# Patient Record
Sex: FEMALE | Race: WHITE | NOT HISPANIC OR LATINO
[De-identification: names, ages, dates, MRNs, and addresses within clinical notes are randomized per-mention and may not be internally consistent; named-entity substitution may affect disease eponyms.]

---

## 2017-03-27 ENCOUNTER — APPOINTMENT (OUTPATIENT)
Dept: DERMATOLOGY | Facility: CLINIC | Age: 57
End: 2017-03-27

## 2017-03-27 VITALS
BODY MASS INDEX: 18.78 KG/M2 | DIASTOLIC BLOOD PRESSURE: 62 MMHG | WEIGHT: 110 LBS | HEIGHT: 64 IN | SYSTOLIC BLOOD PRESSURE: 118 MMHG

## 2017-03-27 DIAGNOSIS — Z84.0 FAMILY HISTORY OF DISEASES OF THE SKIN AND SUBCUTANEOUS TISSUE: ICD-10-CM

## 2017-03-27 DIAGNOSIS — L30.9 DERMATITIS, UNSPECIFIED: ICD-10-CM

## 2017-04-13 ENCOUNTER — OUTPATIENT (OUTPATIENT)
Dept: OUTPATIENT SERVICES | Facility: HOSPITAL | Age: 57
LOS: 1 days | Discharge: HOME | End: 2017-04-13

## 2017-04-13 DIAGNOSIS — Z98.89 OTHER SPECIFIED POSTPROCEDURAL STATES: Chronic | ICD-10-CM

## 2017-04-13 DIAGNOSIS — C50.919 MALIGNANT NEOPLASM OF UNSPECIFIED SITE OF UNSPECIFIED FEMALE BREAST: ICD-10-CM

## 2017-04-14 ENCOUNTER — APPOINTMENT (OUTPATIENT)
Dept: HEMATOLOGY ONCOLOGY | Facility: CLINIC | Age: 57
End: 2017-04-14

## 2017-04-14 VITALS
HEART RATE: 84 BPM | BODY MASS INDEX: 19.83 KG/M2 | TEMPERATURE: 98.9 F | WEIGHT: 115.52 LBS | SYSTOLIC BLOOD PRESSURE: 110 MMHG | DIASTOLIC BLOOD PRESSURE: 68 MMHG | RESPIRATION RATE: 16 BRPM

## 2017-04-14 RX ORDER — CLOBETASOL PROPIONATE 0.5 MG/G
0.05 OINTMENT TOPICAL
Qty: 1 | Refills: 3 | Status: DISCONTINUED | COMMUNITY
Start: 2017-03-27 | End: 2017-04-14

## 2017-05-01 ENCOUNTER — APPOINTMENT (OUTPATIENT)
Dept: DERMATOLOGY | Facility: CLINIC | Age: 57
End: 2017-05-01

## 2017-08-01 ENCOUNTER — APPOINTMENT (OUTPATIENT)
Dept: SURGICAL ONCOLOGY | Facility: CLINIC | Age: 57
End: 2017-08-01
Payer: COMMERCIAL

## 2017-08-01 VITALS
HEIGHT: 64 IN | DIASTOLIC BLOOD PRESSURE: 69 MMHG | HEART RATE: 82 BPM | WEIGHT: 110 LBS | OXYGEN SATURATION: 100 % | BODY MASS INDEX: 18.78 KG/M2 | SYSTOLIC BLOOD PRESSURE: 113 MMHG

## 2017-08-01 PROCEDURE — 99215 OFFICE O/P EST HI 40 MIN: CPT

## 2017-10-23 ENCOUNTER — OUTPATIENT (OUTPATIENT)
Dept: OUTPATIENT SERVICES | Facility: HOSPITAL | Age: 57
LOS: 1 days | Discharge: HOME | End: 2017-10-23

## 2017-10-23 DIAGNOSIS — Z98.89 OTHER SPECIFIED POSTPROCEDURAL STATES: Chronic | ICD-10-CM

## 2017-10-23 DIAGNOSIS — C50.919 MALIGNANT NEOPLASM OF UNSPECIFIED SITE OF UNSPECIFIED FEMALE BREAST: ICD-10-CM

## 2017-10-27 ENCOUNTER — APPOINTMENT (OUTPATIENT)
Dept: HEMATOLOGY ONCOLOGY | Facility: CLINIC | Age: 57
End: 2017-10-27
Payer: COMMERCIAL

## 2017-10-27 VITALS
HEART RATE: 85 BPM | TEMPERATURE: 98.9 F | RESPIRATION RATE: 16 BRPM | DIASTOLIC BLOOD PRESSURE: 69 MMHG | SYSTOLIC BLOOD PRESSURE: 107 MMHG | WEIGHT: 114.64 LBS | BODY MASS INDEX: 19.68 KG/M2 | OXYGEN SATURATION: 98 %

## 2017-10-27 PROCEDURE — 99214 OFFICE O/P EST MOD 30 MIN: CPT

## 2017-10-27 RX ORDER — ALPRAZOLAM 0.25 MG/1
0.25 TABLET ORAL
Qty: 15 | Refills: 0 | Status: ACTIVE | COMMUNITY
Start: 2017-08-14

## 2018-04-27 ENCOUNTER — OUTPATIENT (OUTPATIENT)
Dept: OUTPATIENT SERVICES | Facility: HOSPITAL | Age: 58
LOS: 1 days | Discharge: HOME | End: 2018-04-27

## 2018-04-27 ENCOUNTER — APPOINTMENT (OUTPATIENT)
Dept: HEMATOLOGY ONCOLOGY | Facility: CLINIC | Age: 58
End: 2018-04-27
Payer: COMMERCIAL

## 2018-04-27 VITALS
HEART RATE: 93 BPM | RESPIRATION RATE: 16 BRPM | TEMPERATURE: 99.4 F | SYSTOLIC BLOOD PRESSURE: 101 MMHG | DIASTOLIC BLOOD PRESSURE: 63 MMHG | OXYGEN SATURATION: 99 % | WEIGHT: 110.34 LBS | BODY MASS INDEX: 18.94 KG/M2

## 2018-04-27 DIAGNOSIS — Z98.89 OTHER SPECIFIED POSTPROCEDURAL STATES: Chronic | ICD-10-CM

## 2018-04-27 DIAGNOSIS — C50.919 MALIGNANT NEOPLASM OF UNSPECIFIED SITE OF UNSPECIFIED FEMALE BREAST: ICD-10-CM

## 2018-04-27 PROCEDURE — 99214 OFFICE O/P EST MOD 30 MIN: CPT

## 2018-06-14 ENCOUNTER — APPOINTMENT (OUTPATIENT)
Dept: PLASTIC SURGERY | Facility: CLINIC | Age: 58
End: 2018-06-14
Payer: SELF-PAY

## 2018-06-14 PROCEDURE — 99024 POSTOP FOLLOW-UP VISIT: CPT

## 2018-07-09 ENCOUNTER — APPOINTMENT (OUTPATIENT)
Dept: PLASTIC SURGERY | Facility: CLINIC | Age: 58
End: 2018-07-09
Payer: COMMERCIAL

## 2018-07-09 PROCEDURE — 99199 UNLISTED SPECIAL SVC PX/RPRT: CPT | Mod: NC

## 2018-08-13 ENCOUNTER — APPOINTMENT (OUTPATIENT)
Dept: PLASTIC SURGERY | Facility: CLINIC | Age: 58
End: 2018-08-13
Payer: COMMERCIAL

## 2018-08-13 PROCEDURE — 99024 POSTOP FOLLOW-UP VISIT: CPT

## 2018-10-01 ENCOUNTER — APPOINTMENT (OUTPATIENT)
Dept: SURGICAL ONCOLOGY | Facility: CLINIC | Age: 58
End: 2018-10-01
Payer: COMMERCIAL

## 2018-10-01 VITALS
SYSTOLIC BLOOD PRESSURE: 95 MMHG | RESPIRATION RATE: 16 BRPM | HEIGHT: 64 IN | WEIGHT: 114 LBS | OXYGEN SATURATION: 98 % | HEART RATE: 85 BPM | BODY MASS INDEX: 19.46 KG/M2 | DIASTOLIC BLOOD PRESSURE: 64 MMHG

## 2018-10-01 PROCEDURE — 99214 OFFICE O/P EST MOD 30 MIN: CPT

## 2018-10-17 ENCOUNTER — OUTPATIENT (OUTPATIENT)
Dept: OUTPATIENT SERVICES | Facility: HOSPITAL | Age: 58
LOS: 1 days | Discharge: HOME | End: 2018-10-17

## 2018-10-17 DIAGNOSIS — Z98.89 OTHER SPECIFIED POSTPROCEDURAL STATES: Chronic | ICD-10-CM

## 2018-10-17 DIAGNOSIS — C50.919 MALIGNANT NEOPLASM OF UNSPECIFIED SITE OF UNSPECIFIED FEMALE BREAST: ICD-10-CM

## 2018-10-23 ENCOUNTER — APPOINTMENT (OUTPATIENT)
Dept: HEMATOLOGY ONCOLOGY | Facility: CLINIC | Age: 58
End: 2018-10-23
Payer: COMMERCIAL

## 2018-10-23 VITALS
OXYGEN SATURATION: 100 % | SYSTOLIC BLOOD PRESSURE: 98 MMHG | HEART RATE: 80 BPM | TEMPERATURE: 98.9 F | DIASTOLIC BLOOD PRESSURE: 63 MMHG | BODY MASS INDEX: 19.56 KG/M2 | WEIGHT: 113.98 LBS | RESPIRATION RATE: 16 BRPM

## 2018-10-23 PROCEDURE — 99214 OFFICE O/P EST MOD 30 MIN: CPT

## 2018-12-04 ENCOUNTER — RX RENEWAL (OUTPATIENT)
Age: 58
End: 2018-12-04

## 2019-04-22 ENCOUNTER — APPOINTMENT (OUTPATIENT)
Dept: PLASTIC SURGERY | Facility: CLINIC | Age: 59
End: 2019-04-22
Payer: COMMERCIAL

## 2019-04-22 NOTE — REASON FOR VISIT
[Follow-Up: _____] : a [unfilled] follow-up visit [FreeTextEntry1] : Patient presents to the office today for botox injections.

## 2019-04-23 ENCOUNTER — OUTPATIENT (OUTPATIENT)
Dept: OUTPATIENT SERVICES | Facility: HOSPITAL | Age: 59
LOS: 1 days | Discharge: HOME | End: 2019-04-23

## 2019-04-23 DIAGNOSIS — Z98.89 OTHER SPECIFIED POSTPROCEDURAL STATES: Chronic | ICD-10-CM

## 2019-04-23 DIAGNOSIS — C50.919 MALIGNANT NEOPLASM OF UNSPECIFIED SITE OF UNSPECIFIED FEMALE BREAST: ICD-10-CM

## 2019-04-26 ENCOUNTER — APPOINTMENT (OUTPATIENT)
Dept: HEMATOLOGY ONCOLOGY | Facility: CLINIC | Age: 59
End: 2019-04-26
Payer: COMMERCIAL

## 2019-04-26 VITALS
WEIGHT: 115.74 LBS | HEART RATE: 95 BPM | TEMPERATURE: 99.4 F | SYSTOLIC BLOOD PRESSURE: 110 MMHG | DIASTOLIC BLOOD PRESSURE: 67 MMHG | BODY MASS INDEX: 19.87 KG/M2 | RESPIRATION RATE: 16 BRPM | OXYGEN SATURATION: 100 %

## 2019-04-26 PROCEDURE — 99214 OFFICE O/P EST MOD 30 MIN: CPT

## 2019-04-30 NOTE — PHYSICAL EXAM
[Fully active, able to carry on all pre-disease performance without restriction] : Status 0 - Fully active, able to carry on all pre-disease performance without restriction [Normal] : affect appropriate [de-identified] : s/p B/L MRM with reconstruction, no masses; B/L ax neg

## 2019-04-30 NOTE — HISTORY OF PRESENT ILLNESS
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 Oncotype DX RS 7 = 6% risk of mets at 10 years with shelby alone. (low risk). Began tamoxifen 9/14.  \par \par In 2018 had [facility] Analyses of 34 Genes Associated with Hereditary Cancer\par Found to have : VUS of CHEK2 and POLE \par \par  [de-identified] : Here for a follow up visit, and doing well.. Tolerating tamoxifen well, without any related side effects. Occasional cramping of the feet, no change and easy to tolerate. She notes a good appetite, stable wt and excellent performance status.  Not dancing as much as working but stays active.  Is in the process of moving to a new home soon. \par \par Continues to have reg menses with occasional irreg / missed periods. \par \par Had [facility] Analyses of 34 Genes Associated with Hereditary Cancer\par Found to have : VUS of CHEK2 and POLE \par \par colonoscopy: 2015 (?benign polyp removed, 5 year f/up recommended)\par Pap: 8/2018: neg\par

## 2019-09-20 ENCOUNTER — TRANSCRIPTION ENCOUNTER (OUTPATIENT)
Age: 59
End: 2019-09-20

## 2019-10-07 ENCOUNTER — APPOINTMENT (OUTPATIENT)
Dept: PLASTIC SURGERY | Facility: CLINIC | Age: 59
End: 2019-10-07
Payer: COMMERCIAL

## 2019-10-07 DIAGNOSIS — Z90.13 ACQUIRED ABSENCE OF BILATERAL BREASTS AND NIPPLES: ICD-10-CM

## 2019-10-07 PROCEDURE — 99214 OFFICE O/P EST MOD 30 MIN: CPT

## 2019-11-05 ENCOUNTER — APPOINTMENT (OUTPATIENT)
Dept: SURGICAL ONCOLOGY | Facility: CLINIC | Age: 59
End: 2019-11-05
Payer: COMMERCIAL

## 2019-11-05 VITALS
DIASTOLIC BLOOD PRESSURE: 63 MMHG | HEIGHT: 64 IN | BODY MASS INDEX: 19.12 KG/M2 | WEIGHT: 112 LBS | SYSTOLIC BLOOD PRESSURE: 113 MMHG | OXYGEN SATURATION: 100 % | HEART RATE: 90 BPM | RESPIRATION RATE: 16 BRPM

## 2019-11-05 PROCEDURE — 99214 OFFICE O/P EST MOD 30 MIN: CPT

## 2019-11-05 NOTE — PHYSICAL EXAM
[Normal] : supple, no neck mass and thyroid not enlarged [Normal Supraclavicular Lymph Nodes] : normal supraclavicular lymph nodes [Normal Axillary Lymph Nodes] : normal axillary lymph nodes [Normal] : oriented to person, place and time, with appropriate affect [FreeTextEntry1] : RC present for exam.  [de-identified] : Normal S1, S2. Regular rate and rhythm. [de-identified] : Complete breast exam performed in supine and upright positions. S/p b/l mastectomy with reconstruction.  No enlarged axillary or supraclavicular lymph nodes.  [de-identified] : Clear breath sounds bilaterally, normal respiratory effort.

## 2019-11-05 NOTE — ASSESSMENT
[FreeTextEntry1] : Imp:\par No evidence of new or recurrent lesions - hx of right breast infiltrating carcinoma s/p b/l mastectomy with reconstruction\par No suspicious lesions on exam.\par \par Plan:\par Continue yearly surveillance\par Replacement of textured implants with Dr. Gomez Jan 2020\par \par

## 2019-11-07 ENCOUNTER — OUTPATIENT (OUTPATIENT)
Dept: OUTPATIENT SERVICES | Facility: HOSPITAL | Age: 59
LOS: 1 days | Discharge: HOME | End: 2019-11-07

## 2019-11-07 DIAGNOSIS — C50.919 MALIGNANT NEOPLASM OF UNSPECIFIED SITE OF UNSPECIFIED FEMALE BREAST: ICD-10-CM

## 2019-11-07 DIAGNOSIS — Z98.89 OTHER SPECIFIED POSTPROCEDURAL STATES: Chronic | ICD-10-CM

## 2019-11-08 ENCOUNTER — APPOINTMENT (OUTPATIENT)
Dept: HEMATOLOGY ONCOLOGY | Facility: CLINIC | Age: 59
End: 2019-11-08
Payer: COMMERCIAL

## 2019-11-08 VITALS
BODY MASS INDEX: 19.83 KG/M2 | RESPIRATION RATE: 16 BRPM | HEART RATE: 87 BPM | TEMPERATURE: 98.2 F | DIASTOLIC BLOOD PRESSURE: 71 MMHG | OXYGEN SATURATION: 100 % | SYSTOLIC BLOOD PRESSURE: 117 MMHG | WEIGHT: 115.52 LBS

## 2019-11-08 PROCEDURE — 99214 OFFICE O/P EST MOD 30 MIN: CPT

## 2019-11-08 NOTE — PHYSICAL EXAM
[Fully active, able to carry on all pre-disease performance without restriction] : Status 0 - Fully active, able to carry on all pre-disease performance without restriction [Normal] : affect appropriate [de-identified] : s/p B/L MRM with reconstruction, no masses; B/L ax neg

## 2019-11-08 NOTE — HISTORY OF PRESENT ILLNESS
[Disease: _____________________] : Disease: [unfilled] [T: ___] : T[unfilled] [N: ___] : N[unfilled] [AJCC Stage: ____] : AJCC Stage: [unfilled] [M: ___] : M[unfilled] [de-identified] : Here for a follow up visit, and doing well.. \par \par Tolerating tamoxifen well, without any related side effects. Occasional cramping of the feet, no change and easy to tolerate. She notes a good appetite, stable wt and excellent performance status.  Going back to dancing. \par \par Was informed she has textured implant sat risk for causing lymphoma. She was contacted by Dr. Patricia antony dis planning on having exchange i\par \par Now has irreg menses, last 2 weeks ago.. \par \par Had [facility] Analyses of 34 Genes Associated with Hereditary Cancer\par Found to have : VUS of CHEK2 and POLE \par \par colonoscopy: 2015 (?benign polyp removed, 5 year f/up recommended)\par Pap: 8/2018: neg\par  [de-identified] : H/O B/L MRM / reconstruction for right breast 0.7 cm predom mod diff IDC and focally with features of ILC, LCIS, 0/6 SLNs, ER+ AR+ Her 2 neg; Left breast neg with 0/3 left SLNs. Oncotype DX RS 7 = 6% risk of mets at 10 years with shelby alone. (low risk). Began tamoxifen 9/14.  \par \par In 2018 had [facility] Analyses of 34 Genes Associated with Hereditary Cancer\par Found to have : VUS of CHEK2 and POLE \par \par

## 2019-11-10 ENCOUNTER — TRANSCRIPTION ENCOUNTER (OUTPATIENT)
Age: 59
End: 2019-11-10

## 2020-01-08 ENCOUNTER — RX RENEWAL (OUTPATIENT)
Age: 60
End: 2020-01-08

## 2020-03-30 ENCOUNTER — APPOINTMENT (OUTPATIENT)
Dept: PLASTIC SURGERY | Facility: CLINIC | Age: 60
End: 2020-03-30

## 2020-05-20 ENCOUNTER — OUTPATIENT (OUTPATIENT)
Dept: OUTPATIENT SERVICES | Facility: HOSPITAL | Age: 60
LOS: 1 days | Discharge: HOME | End: 2020-05-20

## 2020-05-20 DIAGNOSIS — Z98.89 OTHER SPECIFIED POSTPROCEDURAL STATES: Chronic | ICD-10-CM

## 2020-05-20 DIAGNOSIS — C50.919 MALIGNANT NEOPLASM OF UNSPECIFIED SITE OF UNSPECIFIED FEMALE BREAST: ICD-10-CM

## 2020-05-22 ENCOUNTER — APPOINTMENT (OUTPATIENT)
Dept: HEMATOLOGY ONCOLOGY | Facility: CLINIC | Age: 60
End: 2020-05-22
Payer: COMMERCIAL

## 2020-05-22 PROCEDURE — 99213 OFFICE O/P EST LOW 20 MIN: CPT | Mod: 95

## 2020-05-22 RX ORDER — SULFAMETHOXAZOLE AND TRIMETHOPRIM 800; 160 MG/1; MG/1
800-160 TABLET ORAL
Qty: 14 | Refills: 0 | Status: DISCONTINUED | COMMUNITY
Start: 2020-01-08 | End: 2020-05-22

## 2020-05-22 RX ORDER — OXYCODONE AND ACETAMINOPHEN 5; 325 MG/1; MG/1
5-325 TABLET ORAL
Qty: 20 | Refills: 0 | Status: DISCONTINUED | COMMUNITY
Start: 2020-01-08 | End: 2020-05-22

## 2020-05-22 NOTE — HISTORY OF PRESENT ILLNESS
[Home] : at home, [unfilled] , at the time of the visit. [Medical Office: ([facility])___] : at the medical office located in  [Verbal consent obtained from patient] : the patient, [unfilled] [Disease: _____________________] : Disease: [unfilled] [T: ___] : T[unfilled] [N: ___] : N[unfilled] [M: ___] : M[unfilled] [AJCC Stage: ____] : AJCC Stage: [unfilled] [de-identified] : H/O B/L MRM / reconstruction for right breast 0.7 cm predom mod diff IDC and focally with features of ILC, LCIS, 0/6 SLNs, ER+ ND+ Her 2 neg; Left breast neg with 0/3 left SLNs. Oncotype DX RS 7 = 6% risk of mets at 10 years with shelby alone. (low risk). Began tamoxifen 9/14.  \par \par In 2018 had [facility] Analyses of 34 Genes Associated with Hereditary Cancer\par Found to have : VUS of CHEK2 and POLE \par \par  [de-identified] : Seen today for a f/u telehealth visit secondary to the Covid crisis. \par \par Cont to tolerate tamoxifen well, without any related side effects. She notes a good appetite, stable wt and excellent performance status.   \par \par Was informed she has textured implant sat risk for causing lymphoma. She was contacted by Dr. Gomez and was planning on having exchange in January but she delayed. \par \par Cont to have irreg menses, last 2 mo ago and the prior 2 mo earlier\par  \par \par Had [facility] Analyses of 34 Genes Associated with Hereditary Cancer\par Found to have : VUS of CHEK2 and POLE \par \par colonoscopy: 2015 (?benign polyp removed, 5 year f/up recommended)\par Pap: 8/2018: neg\par

## 2020-07-27 ENCOUNTER — OUTPATIENT (OUTPATIENT)
Dept: OUTPATIENT SERVICES | Facility: HOSPITAL | Age: 60
LOS: 1 days | Discharge: HOME | End: 2020-07-27

## 2020-07-27 DIAGNOSIS — Z98.89 OTHER SPECIFIED POSTPROCEDURAL STATES: Chronic | ICD-10-CM

## 2020-07-27 DIAGNOSIS — C50.919 MALIGNANT NEOPLASM OF UNSPECIFIED SITE OF UNSPECIFIED FEMALE BREAST: ICD-10-CM

## 2020-07-29 ENCOUNTER — APPOINTMENT (OUTPATIENT)
Dept: HEMATOLOGY ONCOLOGY | Facility: CLINIC | Age: 60
End: 2020-07-29

## 2020-09-13 ENCOUNTER — OUTPATIENT (OUTPATIENT)
Dept: OUTPATIENT SERVICES | Facility: HOSPITAL | Age: 60
LOS: 1 days | Discharge: HOME | End: 2020-09-13

## 2020-09-13 DIAGNOSIS — Z98.89 OTHER SPECIFIED POSTPROCEDURAL STATES: Chronic | ICD-10-CM

## 2020-09-13 DIAGNOSIS — C50.919 MALIGNANT NEOPLASM OF UNSPECIFIED SITE OF UNSPECIFIED FEMALE BREAST: ICD-10-CM

## 2020-09-15 ENCOUNTER — APPOINTMENT (OUTPATIENT)
Dept: HEMATOLOGY ONCOLOGY | Facility: CLINIC | Age: 60
End: 2020-09-15
Payer: COMMERCIAL

## 2020-09-15 VITALS
BODY MASS INDEX: 20.25 KG/M2 | OXYGEN SATURATION: 100 % | HEIGHT: 64.02 IN | WEIGHT: 118.61 LBS | RESPIRATION RATE: 16 BRPM | HEART RATE: 76 BPM | DIASTOLIC BLOOD PRESSURE: 71 MMHG | TEMPERATURE: 98.6 F | SYSTOLIC BLOOD PRESSURE: 104 MMHG

## 2020-09-15 PROCEDURE — 99213 OFFICE O/P EST LOW 20 MIN: CPT

## 2020-09-16 NOTE — PHYSICAL EXAM
[Fully active, able to carry on all pre-disease performance without restriction] : Status 0 - Fully active, able to carry on all pre-disease performance without restriction [Normal] : affect appropriate [de-identified] : s/p B/L MRMs with implants, no masses. B/L ax neg

## 2020-09-16 NOTE — HISTORY OF PRESENT ILLNESS
[Disease: _____________________] : Disease: [unfilled] [T: ___] : T[unfilled] [N: ___] : N[unfilled] [M: ___] : M[unfilled] [AJCC Stage: ____] : AJCC Stage: [unfilled] [de-identified] : H/O B/L MRM / reconstruction for right breast 0.7 cm predom mod diff IDC and focally with features of ILC, LCIS, 0/6 SLNs, ER+ [place]+ Her 2 neg; Left breast neg with 0/3 left SLNs. Oncotype DX RS 7 = 6% risk of mets at 10 years with shelby alone. (low risk). Began tamoxifen 9/14.  \par \par In 2018 had [facility] Analyses of 34 Genes Associated with Hereditary Cancer\par Found to have : VUS of CHEK2 and POLE \par \par  [de-identified] : Seen today for a f/u visit. \par \par Cont to tolerate tamoxifen well, without any related side effects. She notes a good appetite, stable wt and excellent performance status, although she is exercising less than usual. Getting ready to go back to school. .   \par \par Cont to have irreg menses over the past year and LMP was 7/20. \par  \par Had [facility] Analyses of 34 Genes Associated with Hereditary Cancer\par Found to have : VUS of CHEK2 and POLE \par \par

## 2020-09-21 ENCOUNTER — APPOINTMENT (OUTPATIENT)
Dept: PLASTIC SURGERY | Facility: CLINIC | Age: 60
End: 2020-09-21
Payer: SELF-PAY

## 2020-12-22 NOTE — HISTORY OF PRESENT ILLNESS
[de-identified] : Cammy is a 58-year-old woman status post bilateral mastectomy for infiltrating carcinoma (0.7cm) with reconstruction on 2/27/15 by Dr. Gomez. She is presently taking tamoxifen under the guidance of Dr. Mcgill (med/onc).  Because of the [employer] textured impant recall the patient plans on having them replaced with Dr. Gomez Norman 10, 2020.\par \par Today she denies chest wall discomfort, palpable chest wall nodules or skin changes. 10

## 2021-03-08 ENCOUNTER — APPOINTMENT (OUTPATIENT)
Dept: PLASTIC SURGERY | Facility: CLINIC | Age: 61
End: 2021-03-08
Payer: SELF-PAY

## 2021-04-08 ENCOUNTER — NON-APPOINTMENT (OUTPATIENT)
Age: 61
End: 2021-04-08

## 2021-04-23 ENCOUNTER — OUTPATIENT (OUTPATIENT)
Dept: OUTPATIENT SERVICES | Facility: HOSPITAL | Age: 61
LOS: 1 days | Discharge: HOME | End: 2021-04-23

## 2021-04-23 DIAGNOSIS — Z98.89 OTHER SPECIFIED POSTPROCEDURAL STATES: Chronic | ICD-10-CM

## 2021-04-23 DIAGNOSIS — C50.919 MALIGNANT NEOPLASM OF UNSPECIFIED SITE OF UNSPECIFIED FEMALE BREAST: ICD-10-CM

## 2021-05-10 ENCOUNTER — APPOINTMENT (OUTPATIENT)
Dept: SURGICAL ONCOLOGY | Facility: CLINIC | Age: 61
End: 2021-05-10
Payer: COMMERCIAL

## 2021-05-10 VITALS
HEART RATE: 93 BPM | HEIGHT: 64 IN | WEIGHT: 115 LBS | BODY MASS INDEX: 19.63 KG/M2 | DIASTOLIC BLOOD PRESSURE: 71 MMHG | RESPIRATION RATE: 15 BRPM | SYSTOLIC BLOOD PRESSURE: 105 MMHG | OXYGEN SATURATION: 99 %

## 2021-05-10 PROCEDURE — 99072 ADDL SUPL MATRL&STAF TM PHE: CPT

## 2021-05-10 PROCEDURE — 99213 OFFICE O/P EST LOW 20 MIN: CPT

## 2021-05-10 NOTE — PHYSICAL EXAM
[Normal Supraclavicular Lymph Nodes] : normal supraclavicular lymph nodes [Normal Axillary Lymph Nodes] : normal axillary lymph nodes [Normal] : oriented to person, place and time, with appropriate affect [FreeTextEntry1] : GS was present during the exam  [de-identified] : Normal S1, S2. regular rate and rhythm. [de-identified] : Complete breast exam performed in supine and upright positions.patient s/p mastectomies  No palpable masses in chest wall, no tenderness, or enlarged axillary or supraclavicular lymph nodes bilaterally.  [de-identified] : Clear breath sounds bilaterally, normal respiratory effort.

## 2021-05-10 NOTE — ASSESSMENT
[FreeTextEntry1] : Imp:\par No evidence of new or recurrent lesions - hx of right breast infiltrating carcinoma s/p b/l mastectomy with reconstruction\par No suspicious lesions on exam.\par \par Plan:\par Continue yearly surveillance May 2022\par Replacement of textured implants with Dr. Gomez Jan 2020\par \par

## 2021-05-10 NOTE — HISTORY OF PRESENT ILLNESS
[de-identified] : Cammy is a 60-year-old woman status post bilateral mastectomy for infiltrating carcinoma (0.7cm) with reconstruction on 2/27/15 by Dr. Gomez. She is presently taking tamoxifen under the guidance of Dr. Mcgill (med/onc).  Because of the [employer] textured impant recall the patient plans on having them replaced with Dr. Gomez Norman 10, 2020.\par \par Today she denies chest wall discomfort, palpable chest wall nodules or skin changes.

## 2021-06-01 ENCOUNTER — OUTPATIENT (OUTPATIENT)
Dept: OUTPATIENT SERVICES | Facility: HOSPITAL | Age: 61
LOS: 1 days | Discharge: HOME | End: 2021-06-01

## 2021-06-01 ENCOUNTER — APPOINTMENT (OUTPATIENT)
Dept: HEMATOLOGY ONCOLOGY | Facility: CLINIC | Age: 61
End: 2021-06-01
Payer: COMMERCIAL

## 2021-06-01 VITALS
RESPIRATION RATE: 15 BRPM | WEIGHT: 119.71 LBS | HEIGHT: 63.78 IN | DIASTOLIC BLOOD PRESSURE: 80 MMHG | SYSTOLIC BLOOD PRESSURE: 124 MMHG | TEMPERATURE: 98.1 F | BODY MASS INDEX: 20.69 KG/M2 | HEART RATE: 89 BPM | OXYGEN SATURATION: 98 %

## 2021-06-01 DIAGNOSIS — C50.919 MALIGNANT NEOPLASM OF UNSPECIFIED SITE OF UNSPECIFIED FEMALE BREAST: ICD-10-CM

## 2021-06-01 DIAGNOSIS — Z98.89 OTHER SPECIFIED POSTPROCEDURAL STATES: Chronic | ICD-10-CM

## 2021-06-01 PROCEDURE — 99214 OFFICE O/P EST MOD 30 MIN: CPT

## 2021-06-01 NOTE — PHYSICAL EXAM
[Fully active, able to carry on all pre-disease performance without restriction] : Status 0 - Fully active, able to carry on all pre-disease performance without restriction [Normal] : affect appropriate [de-identified] : s/p B/L MRMs with implants, no masses. B/L ax neg

## 2021-06-15 ENCOUNTER — APPOINTMENT (OUTPATIENT)
Dept: GASTROENTEROLOGY | Facility: CLINIC | Age: 61
End: 2021-06-15

## 2021-08-02 ENCOUNTER — APPOINTMENT (OUTPATIENT)
Dept: PLASTIC SURGERY | Facility: CLINIC | Age: 61
End: 2021-08-02
Payer: SELF-PAY

## 2021-08-02 DIAGNOSIS — Z41.1 ENCOUNTER FOR COSMETIC SURGERY: ICD-10-CM

## 2021-08-18 ENCOUNTER — TRANSCRIPTION ENCOUNTER (OUTPATIENT)
Age: 61
End: 2021-08-18

## 2021-12-10 ENCOUNTER — OUTPATIENT (OUTPATIENT)
Dept: OUTPATIENT SERVICES | Facility: HOSPITAL | Age: 61
LOS: 1 days | Discharge: HOME | End: 2021-12-10

## 2021-12-10 DIAGNOSIS — C50.919 MALIGNANT NEOPLASM OF UNSPECIFIED SITE OF UNSPECIFIED FEMALE BREAST: ICD-10-CM

## 2021-12-10 DIAGNOSIS — Z98.89 OTHER SPECIFIED POSTPROCEDURAL STATES: Chronic | ICD-10-CM

## 2021-12-14 ENCOUNTER — APPOINTMENT (OUTPATIENT)
Dept: HEMATOLOGY ONCOLOGY | Facility: CLINIC | Age: 61
End: 2021-12-14
Payer: COMMERCIAL

## 2021-12-14 VITALS
OXYGEN SATURATION: 99 % | WEIGHT: 117.95 LBS | HEIGHT: 63.78 IN | SYSTOLIC BLOOD PRESSURE: 106 MMHG | HEART RATE: 79 BPM | BODY MASS INDEX: 20.39 KG/M2 | RESPIRATION RATE: 16 BRPM | TEMPERATURE: 98.4 F | DIASTOLIC BLOOD PRESSURE: 71 MMHG

## 2021-12-14 PROCEDURE — 99214 OFFICE O/P EST MOD 30 MIN: CPT

## 2021-12-14 NOTE — HISTORY OF PRESENT ILLNESS
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 Oncotype DX RS 7 = 6% risk of mets at 10 years with shelby alone. (low risk). Began tamoxifen 9/14.  \par \par In 2018 had [facility] Analyses of 34 Genes Associated with Hereditary Cancer\par Found to have : VUS of CHEK2 and POLE \par \par  [de-identified] : Seen today for a f/u visit. \par \par Cont to tolerate tamoxifen well, without any related side effects. \par \par She notes a good appetite, stable wt and excellent performance status. \par \par Reports having a DEXA by outside MD 'mil dosteopenia" per pt  \par \par LMP was 7/20. \par  \par

## 2021-12-14 NOTE — PHYSICAL EXAM
[Fully active, able to carry on all pre-disease performance without restriction] : Status 0 - Fully active, able to carry on all pre-disease performance without restriction [Normal] : affect appropriate [de-identified] : s/p B/L MRMs with implants, no masses. B/L ax neg

## 2022-05-15 ENCOUNTER — NON-APPOINTMENT (OUTPATIENT)
Age: 62
End: 2022-05-15

## 2022-06-14 ENCOUNTER — OUTPATIENT (OUTPATIENT)
Dept: OUTPATIENT SERVICES | Facility: HOSPITAL | Age: 62
LOS: 1 days | Discharge: HOME | End: 2022-06-14

## 2022-06-14 DIAGNOSIS — Z98.89 OTHER SPECIFIED POSTPROCEDURAL STATES: Chronic | ICD-10-CM

## 2022-06-14 DIAGNOSIS — C50.919 MALIGNANT NEOPLASM OF UNSPECIFIED SITE OF UNSPECIFIED FEMALE BREAST: ICD-10-CM

## 2022-06-21 ENCOUNTER — APPOINTMENT (OUTPATIENT)
Dept: HEMATOLOGY ONCOLOGY | Facility: CLINIC | Age: 62
End: 2022-06-21
Payer: COMMERCIAL

## 2022-06-21 VITALS
WEIGHT: 116.84 LBS | OXYGEN SATURATION: 96 % | SYSTOLIC BLOOD PRESSURE: 117 MMHG | BODY MASS INDEX: 20.19 KG/M2 | DIASTOLIC BLOOD PRESSURE: 71 MMHG | RESPIRATION RATE: 16 BRPM | HEART RATE: 79 BPM | TEMPERATURE: 97.3 F

## 2022-06-21 PROCEDURE — 99214 OFFICE O/P EST MOD 30 MIN: CPT

## 2022-06-21 RX ORDER — TRIAMCINOLONE ACETONIDE 1 MG/G
0.1 OINTMENT TOPICAL
Qty: 15 | Refills: 0 | Status: ACTIVE | COMMUNITY
Start: 2022-05-02

## 2022-06-21 NOTE — HISTORY OF PRESENT ILLNESS
[Disease: _____________________] : Disease: [unfilled] [T: ___] : T[unfilled] [N: ___] : N[unfilled] [M: ___] : M[unfilled] [AJCC Stage: ____] : AJCC Stage: [unfilled] [de-identified] : H/O B/L MRM / reconstruction for right breast 0.7 cm predom mod diff IDC and focally with features of ILC, LCIS, 0/6 SLNs, ER+ CT+ Her 2 neg; Left breast neg with 0/3 left SLNs. Oncotype DX RS 7 = 6% risk of mets at 10 years with shelby alone. (low risk). Began tamoxifen 9/14.  \par \par In 2018 had [facility] Analyses of 34 Genes Associated with Hereditary Cancer\par Found to have : VUS of CHEK2 and POLE \par \par  [de-identified] : Seen today for a f/u visit. \par \par Cont to tolerate tamoxifen well, without any related side effects. \par \par She notes a good appetite, stable wt and excellent performance status. \par \par LMP was 7/20. \par  \par

## 2022-06-21 NOTE — PHYSICAL EXAM
[Fully active, able to carry on all pre-disease performance without restriction] : Status 0 - Fully active, able to carry on all pre-disease performance without restriction [Normal] : affect appropriate [de-identified] : s/p B/L MRMs with implants, no masses. B/L ax neg

## 2022-09-13 ENCOUNTER — APPOINTMENT (OUTPATIENT)
Dept: SURGICAL ONCOLOGY | Facility: CLINIC | Age: 62
End: 2022-09-13

## 2022-09-13 VITALS
HEIGHT: 63.78 IN | BODY MASS INDEX: 20.57 KG/M2 | SYSTOLIC BLOOD PRESSURE: 106 MMHG | HEART RATE: 80 BPM | RESPIRATION RATE: 15 BRPM | DIASTOLIC BLOOD PRESSURE: 72 MMHG | OXYGEN SATURATION: 98 % | WEIGHT: 119 LBS | TEMPERATURE: 97.5 F

## 2022-09-13 PROCEDURE — 99214 OFFICE O/P EST MOD 30 MIN: CPT

## 2022-09-13 NOTE — PHYSICAL EXAM
[Normal Supraclavicular Lymph Nodes] : normal supraclavicular lymph nodes [Normal Axillary Lymph Nodes] : normal axillary lymph nodes [Normal] : oriented to person, place and time, with appropriate affect [FreeTextEntry1] : SC was present during the exam  [de-identified] : Normal S1, S2. regular rate and rhythm. [de-identified] : Complete breast exam performed in supine and upright positions.patient s/p mastectomies  No palpable masses in chest wall, no tenderness, or enlarged axillary or supraclavicular lymph nodes bilaterally.  [de-identified] : Clear breath sounds bilaterally, normal respiratory effort.

## 2022-09-13 NOTE — HISTORY OF PRESENT ILLNESS
[de-identified] : Cammy is a 61-year-old woman status post bilateral mastectomy for infiltrating carcinoma (0.7cm) with reconstruction on 2/27/15 by Dr. Gomez. She is presently taking tamoxifen under the guidance of Dr. Mcgill (med/onc).  Because of the [employer] textured implant recall the patient planned on having them replaced with Dr. Gomez Norman 10, 2020 but she didn’t follow through.\par \par Today she denies chest wall discomfort, palpable chest wall nodules or skin changes.

## 2022-09-13 NOTE — ASSESSMENT
[FreeTextEntry1] : Imp:\par No evidence of new or recurrent lesions - hx of right breast infiltrating carcinoma s/p b/l mastectomy with reconstruction\par \par \par Plan:\par Continue yearly surveillance May 2023\par Continue f/u with Dr. Mcgill\par \par \par All medical entries were at my, Dr. Arik Haji, direction. I have reviewed the chart and agree that the record accurately reflects my personal performance of the history, physical exam, assessment and plan. Our office Nurse Practitioner was present of the duration of the office visit. \par \par

## 2022-12-14 ENCOUNTER — OUTPATIENT (OUTPATIENT)
Dept: OUTPATIENT SERVICES | Facility: HOSPITAL | Age: 62
LOS: 1 days | Discharge: HOME | End: 2022-12-14

## 2022-12-14 DIAGNOSIS — Z98.89 OTHER SPECIFIED POSTPROCEDURAL STATES: Chronic | ICD-10-CM

## 2022-12-14 DIAGNOSIS — C50.919 MALIGNANT NEOPLASM OF UNSPECIFIED SITE OF UNSPECIFIED FEMALE BREAST: ICD-10-CM

## 2022-12-20 ENCOUNTER — APPOINTMENT (OUTPATIENT)
Dept: HEMATOLOGY ONCOLOGY | Facility: CLINIC | Age: 62
End: 2022-12-20

## 2022-12-20 VITALS
SYSTOLIC BLOOD PRESSURE: 102 MMHG | HEART RATE: 78 BPM | DIASTOLIC BLOOD PRESSURE: 69 MMHG | TEMPERATURE: 97.2 F | BODY MASS INDEX: 20.2 KG/M2 | WEIGHT: 116.84 LBS | RESPIRATION RATE: 16 BRPM | HEIGHT: 63.78 IN | OXYGEN SATURATION: 98 %

## 2022-12-20 PROCEDURE — 99214 OFFICE O/P EST MOD 30 MIN: CPT

## 2022-12-20 NOTE — HISTORY OF PRESENT ILLNESS
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 Oncotype DX RS 7 = 6% risk of mets at 10 years with shelby alone. (low risk). Began tamoxifen 9/14.  \par \par In 2018 had [facility] Analyses of 34 Genes Associated with Hereditary Cancer\par Found to have : VUS of CHEK2 and POLE \par \par  [de-identified] : Seen today for a f/u visit. \par \par Cont to tolerate tamoxifen well, without any related side effects. \par \par She c/o OA related pain of her L big toe w/o change - recommended ot have surgery - she s not sure\par \par She notes a good appetite, stable wt and excellent performance status. \par \par LMP was approx 1 y ago. \par  \par

## 2022-12-20 NOTE — PHYSICAL EXAM
[Fully active, able to carry on all pre-disease performance without restriction] : Status 0 - Fully active, able to carry on all pre-disease performance without restriction [Normal] : affect appropriate [de-identified] : s/p B/L MRMs with implants, no masses. B/L ax neg

## 2023-06-09 ENCOUNTER — OUTPATIENT (OUTPATIENT)
Dept: OUTPATIENT SERVICES | Facility: HOSPITAL | Age: 63
LOS: 1 days | Discharge: HOME | End: 2023-06-09

## 2023-06-09 DIAGNOSIS — Z98.89 OTHER SPECIFIED POSTPROCEDURAL STATES: Chronic | ICD-10-CM

## 2023-06-09 DIAGNOSIS — C50.919 MALIGNANT NEOPLASM OF UNSPECIFIED SITE OF UNSPECIFIED FEMALE BREAST: ICD-10-CM

## 2023-06-20 ENCOUNTER — APPOINTMENT (OUTPATIENT)
Dept: HEMATOLOGY ONCOLOGY | Facility: CLINIC | Age: 63
End: 2023-06-20
Payer: COMMERCIAL

## 2023-06-20 VITALS
SYSTOLIC BLOOD PRESSURE: 95 MMHG | TEMPERATURE: 96.6 F | WEIGHT: 118.17 LBS | DIASTOLIC BLOOD PRESSURE: 68 MMHG | HEIGHT: 63.78 IN | HEART RATE: 84 BPM | BODY MASS INDEX: 20.42 KG/M2 | RESPIRATION RATE: 16 BRPM | OXYGEN SATURATION: 99 %

## 2023-06-20 PROCEDURE — 99214 OFFICE O/P EST MOD 30 MIN: CPT

## 2023-06-20 NOTE — REVIEW OF SYSTEMS
[Negative] : Psychiatric [FreeTextEntry5] : as above [de-identified] : "hot and cold flashes" - as before per pt

## 2023-06-20 NOTE — HISTORY OF PRESENT ILLNESS
[Disease: _____________________] : Disease: [unfilled] [T: ___] : T[unfilled] [N: ___] : N[unfilled] [M: ___] : M[unfilled] [AJCC Stage: ____] : AJCC Stage: [unfilled] [de-identified] : H/O B/L MRM / reconstruction for right breast 0.7 cm predom mod diff IDC and focally with features of ILC, LCIS, 0/6 SLNs, ER+ PA+ Her 2 neg; Left breast neg with 0/3 left SLNs. Oncotype DX RS 7 = 6% risk of mets at 10 years with shelby alone. (low risk). Began tamoxifen 9/14.  \par \par In 2018 had [facility] Analyses of 34 Genes Associated with Hereditary Cancer\par Found to have : VUS of CHEK2 and POLE \par \par  [de-identified] : Seen today for a f/u visit. \par \par Cont to tolerate tamoxifen well, without any related side effects. \par \par Approx 1 mo ago "I almost passed out twice while dancing - sta down before I passed out". Denies any associated palpitations, chest pain, SOB. Has not had  other such episodes before or afterwards. \par \par She notes a good appetite, stable wt and excellent performance status. \par \par LMP was approx 1 y ago. \par  \par

## 2023-06-20 NOTE — PHYSICAL EXAM
[Fully active, able to carry on all pre-disease performance without restriction] : Status 0 - Fully active, able to carry on all pre-disease performance without restriction [Normal] : affect appropriate [de-identified] : s/p B/L MRMs with implants, no masses. B/L ax neg

## 2023-11-21 ENCOUNTER — APPOINTMENT (OUTPATIENT)
Dept: SURGICAL ONCOLOGY | Facility: CLINIC | Age: 63
End: 2023-11-21
Payer: COMMERCIAL

## 2023-11-21 VITALS
OXYGEN SATURATION: 99 % | HEART RATE: 84 BPM | HEIGHT: 63.78 IN | SYSTOLIC BLOOD PRESSURE: 107 MMHG | DIASTOLIC BLOOD PRESSURE: 70 MMHG | BODY MASS INDEX: 20.39 KG/M2 | WEIGHT: 118 LBS

## 2023-11-21 DIAGNOSIS — Z08 ENCOUNTER FOR FOLLOW-UP EXAMINATION AFTER COMPLETED TREATMENT FOR MALIGNANT NEOPLASM: ICD-10-CM

## 2023-11-21 DIAGNOSIS — Z85.3 ENCOUNTER FOR FOLLOW-UP EXAMINATION AFTER COMPLETED TREATMENT FOR MALIGNANT NEOPLASM: ICD-10-CM

## 2023-11-21 PROCEDURE — 99213 OFFICE O/P EST LOW 20 MIN: CPT

## 2023-11-27 ENCOUNTER — OUTPATIENT (OUTPATIENT)
Dept: OUTPATIENT SERVICES | Facility: HOSPITAL | Age: 63
LOS: 1 days | Discharge: HOME | End: 2023-11-27

## 2023-11-27 DIAGNOSIS — Z98.89 OTHER SPECIFIED POSTPROCEDURAL STATES: Chronic | ICD-10-CM

## 2023-11-27 DIAGNOSIS — C50.919 MALIGNANT NEOPLASM OF UNSPECIFIED SITE OF UNSPECIFIED FEMALE BREAST: ICD-10-CM

## 2023-12-11 ENCOUNTER — APPOINTMENT (OUTPATIENT)
Dept: RADIOLOGY | Facility: CLINIC | Age: 63
End: 2023-12-11
Payer: COMMERCIAL

## 2023-12-11 PROCEDURE — 77080 DXA BONE DENSITY AXIAL: CPT

## 2023-12-12 ENCOUNTER — APPOINTMENT (OUTPATIENT)
Dept: HEMATOLOGY ONCOLOGY | Facility: CLINIC | Age: 63
End: 2023-12-12
Payer: COMMERCIAL

## 2023-12-12 VITALS
TEMPERATURE: 96.8 F | SYSTOLIC BLOOD PRESSURE: 109 MMHG | BODY MASS INDEX: 20.58 KG/M2 | HEART RATE: 90 BPM | OXYGEN SATURATION: 99 % | HEIGHT: 63.78 IN | RESPIRATION RATE: 16 BRPM | WEIGHT: 119.05 LBS | DIASTOLIC BLOOD PRESSURE: 71 MMHG

## 2023-12-12 DIAGNOSIS — Z79.810 LONG TERM (CURRENT) USE OF SELECTIVE ESTROGEN RECEPTOR MODULATORS (SERMS): ICD-10-CM

## 2023-12-12 DIAGNOSIS — C50.919 MALIGNANT NEOPLASM OF UNSPECIFIED SITE OF UNSPECIFIED FEMALE BREAST: ICD-10-CM

## 2023-12-12 PROCEDURE — 99214 OFFICE O/P EST MOD 30 MIN: CPT

## 2023-12-13 PROBLEM — Z79.810 USE OF TAMOXIFEN (NOLVADEX): Status: ACTIVE | Noted: 2021-06-01

## 2023-12-13 NOTE — PHYSICAL EXAM
[Fully active, able to carry on all pre-disease performance without restriction] : Status 0 - Fully active, able to carry on all pre-disease performance without restriction [Normal] : affect appropriate [de-identified] : s/p B/L MRMs with implants, no masses. B/L ax neg

## 2023-12-13 NOTE — HISTORY OF PRESENT ILLNESS
[Disease: _____________________] : Disease: [unfilled] [T: ___] : T[unfilled] [N: ___] : N[unfilled] [M: ___] : M[unfilled] [AJCC Stage: ____] : AJCC Stage: [unfilled] [de-identified] : H/O B/L MRM / reconstruction for right breast 0.7 cm predom mod diff IDC and focally with features of ILC, LCIS, 0/6 SLNs, ER+ [place]+ Her 2 neg; Left breast neg with 0/3 left SLNs. Oncotype DX RS 7 = 6% risk of mets at 10 years with shelby alone. (low risk). Began tamoxifen 9/14.  \par  \par  In 2018 had [facility] Analyses of 34 Genes Associated with Hereditary Cancer\par  Found to have : VUS of CHEK2 and POLE \par  \par   [de-identified] : Seen today for a f/u visit.   Cont to tolerate tamoxifen well, without any related side effects.   Has mild AO of fingers / feet.   She notes a good appetite, stable wt and excellent performance status.

## 2024-02-01 ENCOUNTER — NON-APPOINTMENT (OUTPATIENT)
Age: 64
End: 2024-02-01

## 2024-11-19 ENCOUNTER — NON-APPOINTMENT (OUTPATIENT)
Age: 64
End: 2024-11-19

## 2024-11-19 ENCOUNTER — APPOINTMENT (OUTPATIENT)
Dept: SURGICAL ONCOLOGY | Facility: CLINIC | Age: 64
End: 2024-11-19
Payer: COMMERCIAL

## 2024-11-19 VITALS
OXYGEN SATURATION: 99 % | HEART RATE: 89 BPM | RESPIRATION RATE: 17 BRPM | DIASTOLIC BLOOD PRESSURE: 68 MMHG | BODY MASS INDEX: 20.73 KG/M2 | WEIGHT: 117 LBS | SYSTOLIC BLOOD PRESSURE: 110 MMHG | HEIGHT: 63 IN

## 2024-11-19 DIAGNOSIS — Z85.3 ENCOUNTER FOR FOLLOW-UP EXAMINATION AFTER COMPLETED TREATMENT FOR MALIGNANT NEOPLASM: ICD-10-CM

## 2024-11-19 DIAGNOSIS — Z08 ENCOUNTER FOR FOLLOW-UP EXAMINATION AFTER COMPLETED TREATMENT FOR MALIGNANT NEOPLASM: ICD-10-CM

## 2024-11-19 PROCEDURE — 99213 OFFICE O/P EST LOW 20 MIN: CPT

## 2025-06-24 ENCOUNTER — NON-APPOINTMENT (OUTPATIENT)
Age: 65
End: 2025-06-24

## 2025-06-26 ENCOUNTER — APPOINTMENT (OUTPATIENT)
Dept: PLASTIC SURGERY | Facility: CLINIC | Age: 65
End: 2025-06-26
Payer: MEDICAID

## 2025-06-26 VITALS
SYSTOLIC BLOOD PRESSURE: 111 MMHG | RESPIRATION RATE: 16 BRPM | OXYGEN SATURATION: 99 % | HEIGHT: 63 IN | DIASTOLIC BLOOD PRESSURE: 74 MMHG | WEIGHT: 117 LBS | HEART RATE: 96 BPM | BODY MASS INDEX: 20.73 KG/M2 | TEMPERATURE: 98.4 F

## 2025-06-26 PROCEDURE — 99213 OFFICE O/P EST LOW 20 MIN: CPT

## 2025-07-03 ENCOUNTER — APPOINTMENT (OUTPATIENT)
Dept: MRI IMAGING | Facility: IMAGING CENTER | Age: 65
End: 2025-07-03

## 2025-07-05 ENCOUNTER — APPOINTMENT (OUTPATIENT)
Dept: MRI IMAGING | Facility: IMAGING CENTER | Age: 65
End: 2025-07-05

## 2025-07-07 ENCOUNTER — APPOINTMENT (OUTPATIENT)
Dept: MRI IMAGING | Facility: IMAGING CENTER | Age: 65
End: 2025-07-07

## 2025-07-16 ENCOUNTER — OUTPATIENT (OUTPATIENT)
Dept: OUTPATIENT SERVICES | Facility: HOSPITAL | Age: 65
LOS: 1 days | End: 2025-07-16
Payer: COMMERCIAL

## 2025-07-16 ENCOUNTER — APPOINTMENT (OUTPATIENT)
Dept: MRI IMAGING | Facility: IMAGING CENTER | Age: 65
End: 2025-07-16
Payer: MEDICAID

## 2025-07-16 DIAGNOSIS — Z98.89 OTHER SPECIFIED POSTPROCEDURAL STATES: Chronic | ICD-10-CM

## 2025-07-16 DIAGNOSIS — Z90.13 ACQUIRED ABSENCE OF BILATERAL BREASTS AND NIPPLES: ICD-10-CM

## 2025-07-16 PROCEDURE — 77047 MRI BREAST C- BILATERAL: CPT

## 2025-07-16 PROCEDURE — 77047 MRI BREAST C- BILATERAL: CPT | Mod: 26
